# Patient Record
Sex: MALE | Race: WHITE | NOT HISPANIC OR LATINO
[De-identification: names, ages, dates, MRNs, and addresses within clinical notes are randomized per-mention and may not be internally consistent; named-entity substitution may affect disease eponyms.]

---

## 2017-04-25 ENCOUNTER — RX ONLY (OUTPATIENT)
Age: 27
Setting detail: RX ONLY
End: 2017-04-25

## 2024-06-24 ENCOUNTER — APPOINTMENT (RX ONLY)
Dept: URBAN - METROPOLITAN AREA CLINIC 28 | Facility: CLINIC | Age: 34
Setting detail: DERMATOLOGY
End: 2024-06-24

## 2024-06-24 DIAGNOSIS — L98.8 OTHER SPECIFIED DISORDERS OF THE SKIN AND SUBCUTANEOUS TISSUE: ICD-10-CM

## 2024-06-24 DIAGNOSIS — B35.3 TINEA PEDIS: ICD-10-CM

## 2024-06-24 DIAGNOSIS — D22 MELANOCYTIC NEVI: ICD-10-CM

## 2024-06-24 DIAGNOSIS — Z12.83 ENCOUNTER FOR SCREENING FOR MALIGNANT NEOPLASM OF SKIN: ICD-10-CM

## 2024-06-24 DIAGNOSIS — L81.1 CHLOASMA: ICD-10-CM

## 2024-06-24 DIAGNOSIS — L81.7 PIGMENTED PURPURIC DERMATOSIS: ICD-10-CM

## 2024-06-24 DIAGNOSIS — D18.0 HEMANGIOMA: ICD-10-CM

## 2024-06-24 PROBLEM — D18.01 HEMANGIOMA OF SKIN AND SUBCUTANEOUS TISSUE: Status: ACTIVE | Noted: 2024-06-24

## 2024-06-24 PROBLEM — D22.5 MELANOCYTIC NEVI OF TRUNK: Status: ACTIVE | Noted: 2024-06-24

## 2024-06-24 PROCEDURE — ? COUNSELING

## 2024-06-24 PROCEDURE — ? PATIENT EDUCATION

## 2024-06-24 PROCEDURE — 99203 OFFICE O/P NEW LOW 30 MIN: CPT

## 2024-06-24 PROCEDURE — ? OBSERVATION AND MEASURE

## 2024-06-24 ASSESSMENT — LOCATION ZONE DERM
LOCATION ZONE: NECK
LOCATION ZONE: TRUNK
LOCATION ZONE: FACE
LOCATION ZONE: SCALP
LOCATION ZONE: LEG

## 2024-06-24 ASSESSMENT — LOCATION DETAILED DESCRIPTION DERM
LOCATION DETAILED: LEFT SUPERIOR OCCIPITAL SCALP
LOCATION DETAILED: LEFT CENTRAL MALAR CHEEK
LOCATION DETAILED: RIGHT DISTAL PRETIBIAL REGION
LOCATION DETAILED: SUPERIOR THORACIC SPINE
LOCATION DETAILED: RIGHT CENTRAL MALAR CHEEK
LOCATION DETAILED: RIGHT CLAVICULAR NECK
LOCATION DETAILED: LEFT SUPERIOR PARIETAL SCALP
LOCATION DETAILED: LEFT DISTAL PRETIBIAL REGION

## 2024-06-24 ASSESSMENT — LOCATION SIMPLE DESCRIPTION DERM
LOCATION SIMPLE: RIGHT CHEEK
LOCATION SIMPLE: UPPER BACK
LOCATION SIMPLE: SCALP
LOCATION SIMPLE: RIGHT PRETIBIAL REGION
LOCATION SIMPLE: LEFT CHEEK
LOCATION SIMPLE: RIGHT ANTERIOR NECK
LOCATION SIMPLE: LEFT PRETIBIAL REGION

## 2024-06-24 NOTE — HPI: OTHER
Condition:: Full body exam
Please Describe Your Condition:: is a new patient who is being seen for a chief complaint of Full body exam . Patient does not have any history of skin cancer and has never had a screening done before. \\n\\nHe was noticing tan patches on both cheeks several months ago, but his wife sees Dr. Quinones, so his wife told him to start wearing sunscreen every day and the tan patches have improved a lot.
Condition:: Nevus Sebaceous
Please Describe Your Condition:: The patient has an old growth since early childhood without changes or symptoms. There is no hair growing out of it but it has always been like that.

## 2024-06-24 NOTE — PROCEDURE: COUNSELING
Skin Checks Recommendations: sse q month\\nFBSE with derm q year
Patient Specific Counseling (Will Not Stick From Patient To Patient): -\\n\\nSun screen handout given to the patient.
Detail Level: Generalized
Patient Specific Counseling (Will Not Stick From Patient To Patient): --\\n\\nthe patient reports that this began with old ankle injuries and improves if he wears compression stockings, but he just doesn't wear them regularly. the problem has been stable, maybe even better over the years.
Detail Level: Detailed
Patient Specific Counseling (Will Not Stick From Patient To Patient): -\\nDiscussed nature and risk of skin cancer occuring in these. Discussed excision vs observation and risks and benefits of each.\\nReviewed since it has been present for so long and no changes, continued observation may be okay. \\nPatient agrees to observe with his wife’s help, he will return to the clinic with changes.
Patient Specific Counseling (Will Not Stick From Patient To Patient): -\\n\\nSome present today; but it is improving with sun screen.
Detail Level: Zone
Patient Specific Counseling (Will Not Stick From Patient To Patient): -\\n\\nRecommended to treat with over the counter lamisil or lotrimin cream twice a day.

## 2025-06-24 ENCOUNTER — APPOINTMENT (OUTPATIENT)
Dept: URBAN - METROPOLITAN AREA CLINIC 28 | Facility: CLINIC | Age: 35
Setting detail: DERMATOLOGY
End: 2025-06-24

## 2025-06-24 DIAGNOSIS — L43.8 OTHER LICHEN PLANUS: ICD-10-CM

## 2025-06-24 DIAGNOSIS — Z12.83 ENCOUNTER FOR SCREENING FOR MALIGNANT NEOPLASM OF SKIN: ICD-10-CM

## 2025-06-24 DIAGNOSIS — L81.7 PIGMENTED PURPURIC DERMATOSIS: ICD-10-CM

## 2025-06-24 DIAGNOSIS — B35.1 TINEA UNGUIUM: ICD-10-CM | Status: STABLE

## 2025-06-24 DIAGNOSIS — L81.1 CHLOASMA: ICD-10-CM

## 2025-06-24 DIAGNOSIS — I83.9 ASYMPTOMATIC VARICOSE VEINS OF LOWER EXTREMITIES: ICD-10-CM

## 2025-06-24 DIAGNOSIS — Q82.5 CONGENITAL NON-NEOPLASTIC NEVUS: ICD-10-CM

## 2025-06-24 PROBLEM — I83.93 ASYMPTOMATIC VARICOSE VEINS OF BILATERAL LOWER EXTREMITIES: Status: ACTIVE | Noted: 2025-06-24

## 2025-06-24 PROBLEM — D23.72 OTHER BENIGN NEOPLASM OF SKIN OF LEFT LOWER LIMB, INCLUDING HIP: Status: ACTIVE | Noted: 2025-06-24

## 2025-06-24 PROCEDURE — ? COUNSELING

## 2025-06-24 PROCEDURE — ? OBSERVATION

## 2025-06-24 PROCEDURE — ? PRESCRIPTION MEDICATION MANAGEMENT

## 2025-06-24 ASSESSMENT — LOCATION SIMPLE DESCRIPTION DERM
LOCATION SIMPLE: RIGHT 5TH TOE
LOCATION SIMPLE: RIGHT PRETIBIAL REGION
LOCATION SIMPLE: SCALP
LOCATION SIMPLE: LEFT 2ND TOE
LOCATION SIMPLE: LEFT PRETIBIAL REGION
LOCATION SIMPLE: LEFT 3RD TOE
LOCATION SIMPLE: LEFT FOOT
LOCATION SIMPLE: LEFT GREAT TOE
LOCATION SIMPLE: RIGHT FOOT
LOCATION SIMPLE: LEFT 5TH TOE
LOCATION SIMPLE: RIGHT 3RD TOE
LOCATION SIMPLE: RIGHT GREAT TOE
LOCATION SIMPLE: RIGHT 4TH TOE
LOCATION SIMPLE: LEFT 4TH TOE
LOCATION SIMPLE: RIGHT 2ND TOE

## 2025-06-24 ASSESSMENT — LOCATION ZONE DERM
LOCATION ZONE: SCALP
LOCATION ZONE: TOENAIL
LOCATION ZONE: TOE
LOCATION ZONE: LEG
LOCATION ZONE: FEET

## 2025-06-24 ASSESSMENT — LOCATION DETAILED DESCRIPTION DERM
LOCATION DETAILED: LEFT LATERAL 5TH TOE
LOCATION DETAILED: RIGHT 4TH TOENAIL
LOCATION DETAILED: RIGHT DISTAL PRETIBIAL REGION
LOCATION DETAILED: RIGHT DORSAL FOOT
LOCATION DETAILED: RIGHT 2ND TOENAIL
LOCATION DETAILED: RIGHT DORSAL GREAT TOE
LOCATION DETAILED: LEFT DISTAL PLANTAR 4TH TOE
LOCATION DETAILED: LEFT LATERAL DISTAL PRETIBIAL REGION
LOCATION DETAILED: LEFT DORSAL FOOT
LOCATION DETAILED: LEFT CENTRAL FRONTAL SCALP
LOCATION DETAILED: RIGHT 3RD TOENAIL
LOCATION DETAILED: RIGHT 5TH TOENAIL
LOCATION DETAILED: LEFT DISTAL PRETIBIAL REGION
LOCATION DETAILED: LEFT 2ND TOENAIL
LOCATION DETAILED: LEFT DORSAL GREAT TOE
LOCATION DETAILED: LEFT DORSAL 3RD TOE
LOCATION DETAILED: RIGHT MEDIAL DISTAL PRETIBIAL REGION
LOCATION DETAILED: RIGHT LATERAL DISTAL PRETIBIAL REGION

## 2025-06-24 NOTE — PROCEDURE: PRESCRIPTION MEDICATION MANAGEMENT
Detail Level: Zone
Plan: RTC to consider biopsy of this rash to be sure of its nature
Continue Regimen: proper use of triamcinolone that PCP rx'd was discussed and topical steroid risks/side effects. BID to affected areas, but not for chronic daily use. 
Render In Strict Bullet Format?: No
Continue Regimen: proper use of the Ciclopirox solution that PCP rx'd was dicussed

## 2025-06-24 NOTE — HPI: OTHER
Condition:: Full body exam
Please Describe Your Condition:: Patient does not have a history of skin cancer.
Condition:: Onychomycosis
Please Describe Your Condition:: is an established patient who is being seen for a chief complaint of Onychomycosis . Patient reports that her PCP gave him ciclopirox solution to use on his toenails. \\nHe’s been using it for a couple months now but he only uses it every couple days, he thinks it has helped some.\\n\\nHis pcp recommended to try this first to try and avoid the oral medication.
Condition:: [place] disease
Please Describe Your Condition:: Patient reports that after his last visit with us, he seen his PCP (2 months after he was here) and they gave him triamcinolone to use on the right ankle. \\nHe has been using the triamcinolone about every 2-3 days and he thinks it has improved some. He is aware that it’s not going to go away though.
Condition:: Nevus sebaceous
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
Condition:: Melasma
Please Describe Your Condition:: The patient has chronic tan patches of both cheek bones but thinks it is a lot better since we advised strict daily sun protection at his last appointment.

## 2025-06-24 NOTE — PROCEDURE: COUNSELING
Detail Level: Generalized
Skin Checks Recommendations: SSE q month\\nFBSE with derm q year
Detail Level: Detailed
Patient Specific Counseling (Will Not Stick From Patient To Patient): -\\n\\nPatient was given the ciclopirox Rx by PCP, but he only uses it about every 2-3 days. Discussed regular use. cure rate is low. Patient reports PCP does not want him to use systemic medications for toe nail fungus. Briefly discussed eval by podiatry-to consider adding laser treatment. 
Patient Specific Counseling (Will Not Stick From Patient To Patient): -\\n\\nLeft lateral foot/ankle\\n\\nPCP addressing/patient aware
Prescription Strength Graduated Compression Stockings Recommendations: The patient was counseled that prescription strength graduated compression stockings should be worn for all waking hours. They will follow up with a venous specialist to monitor graduated compression stocking usage and their symptoms.
Patient Specific Counseling (Will Not Stick From Patient To Patient): -\\n\\nLower legs -mild old and stable\\n\\nPatients PCP gave him Triamcinolone ointment to use on this rash every 2-3 days, and he has been using it for about 9 months
Patient Specific Counseling (Will Not Stick From Patient To Patient): -\\ndiscussed the probable/possible diagnosis of LP\\n On the top and sides of the feet/ankles.\\n\\nnature discussed. Biopsy offered-patient declines for now\\n\\nNo history of hepatitis-discussed association in [place] and that sometime hepatitis panel is checked. the patient will consider having that checked with PCP who checks labs.\\nHe is in good health and feels fine. \\n\\nReviewed that this does not look like part of the [place], these really dark areas, papules, look more like lichen planus. \\n\\nThe patient does not have any more spots like this anywhere else. \\n\\nHe has been using the triamcinolone ointment on these areas that his PCP rx'd every 2-3 days for 9 months and it seems to be helping. The bumps are flattening out and going away.\\n\\nDiscussed that we could possibly do a biopsy of this rash to see if that is what is going on, but the patient declines right now since it is getting better. \\n\\nReviewed that if it is this lichen planus, it likes to happen on the wrists and on the back as well. \\nReviewed that for some people it can itch a lot, but not always. \\nReviewed that sometimes Lichen planus can be associated with hepatitis but not so much in the [place]. \\n\\nDiscussed the possible risk of topical steroids long term; increased risk of skin injections, possible stretch marks, and thinning of the skin.\\n\\nAAD Handout on LP given\\nRTC if the rash persists or new areas occur
Patient Specific Counseling (Will Not Stick From Patient To Patient): --\\n\\nthe patient feels this problem is better with strict pp
Detail Level: Zone
Patient Specific Counseling (Will Not Stick From Patient To Patient): --\\n\\ndiscusssed continued observation vs excision and risks/benefits of each. the patient elects to continue to observe with help of wife and his stylist. Report any changes or symptoms as skin cancer can occur in this.